# Patient Record
(demographics unavailable — no encounter records)

---

## 2024-11-13 NOTE — DISCUSSION/SUMMARY
[Normal Growth] : growth [Normal Development] : development [None] : No known medical problems [No Elimination Concerns] : elimination [No Feeding Concerns] : feeding [No Skin Concerns] : skin [Normal Sleep Pattern] : sleep [Family Support] : family support [Child Development and Behavior] : child development and behavior [Language Promotion/Hearing] : language promotion/hearing [Toliet Training Readiness] : toliet training readiness [Safety] : safety [No Medications] : ~He/She~ is not on any medications [Parent/Guardian] : parent/guardian [] : The components of the vaccine(s) to be administered today are listed in the plan of care. The disease(s) for which the vaccine(s) are intended to prevent and the risks have been discussed with the caretaker.  The risks are also included in the appropriate vaccination information statements which have been provided to the patient's caregiver.  The caregiver has given consent to vaccinate. [FreeTextEntry1] : Healthy 18 month old Growth and development: normal M-CHAT: normal Discussed safety/anticipatory guidance Discussed need for vaccines, reviewed side effects and VIS Next PE: age 2 years

## 2024-11-13 NOTE — HISTORY OF PRESENT ILLNESS
[Mother] : mother [Normal] : Normal [Playtime] : Playtime  [No] : No cigarette smoke exposure [Water heater temperature set at <120 degrees F] : Water heater temperature set at <120 degrees F [Car seat in back seat] : Car seat in back seat [Carbon Monoxide Detectors] : Carbon monoxide detectors [Smoke Detectors] : Smoke detectors [Up to date] : Up to date [NO] : No

## 2024-11-13 NOTE — PHYSICAL EXAM
[Alert] : alert [No Acute Distress] : no acute distress [Normocephalic] : normocephalic [Anterior [place] Closed] : anterior fontanelle closed [Red Reflex Bilateral] : red reflex bilateral [PERRL] : PERRL [Normally Placed Ears] : normally placed ears [Auricles Well Formed] : auricles well formed [Clear Tympanic membranes with present light reflex and bony landmarks] : clear tympanic membranes with present light reflex and bony landmarks [No Discharge] : no discharge [Nares Patent] : nares patent [Palate Intact] : palate intact [Uvula Midline] : uvula midline [Tooth Eruption] : tooth eruption  [Supple, full passive range of motion] : supple, full passive range of motion [No Palpable Masses] : no palpable masses [Symmetric Chest Rise] : symmetric chest rise [Clear to Auscultation Bilaterally] : clear to auscultation bilaterally [Regular Rate and Rhythm] : regular rate and rhythm [S1, S2 present] : S1, S2 present [No Murmurs] : no murmurs [+2 Femoral Pulses] : +2 femoral pulses [Soft] : soft [NonTender] : non tender [Non Distended] : non distended [Normoactive Bowel Sounds] : normoactive bowel sounds [No Hepatomegaly] : no hepatomegaly [No Splenomegaly] : no splenomegaly [Central Urethral Opening] : central urethral opening [Testicles Descended Bilaterally] : testicles descended bilaterally [Patent] : patent [Normally Placed] : normally placed [No Abnormal Lymph Nodes Palpated] : no abnormal lymph nodes palpated [No Clavicular Crepitus] : no clavicular crepitus [Symmetric Buttocks Creases] : symmetric buttocks creases [No Spinal Dimple] : no spinal dimple [NoTuft of Hair] : no tuft of hair [Cranial Nerves Grossly Intact] : cranial nerves grossly intact [No Rash or Lesions] : no rash or lesions [FreeTextEntry5] : left eye purulent discharge

## 2024-12-16 NOTE — DISCUSSION/SUMMARY
[FreeTextEntry1] : 21 month old w/ likely uri. well appearing, of note w/ strep exposure at home, rapid done in office negative -f/u rvp and throat culture - Recommended supportive care, including antipyretics, fluids, nasal suction, use of humidifiers, and elevating head w/ extra pillow for postnasal drip.  - If new or worsening symptoms or parental concern - return to office or ED.

## 2025-01-06 NOTE — HISTORY OF PRESENT ILLNESS
[de-identified] : COUGH X 1 MONTH [FreeTextEntry6] : Nasal congestion and Cough x3 weeks. No improvement.  No fever, vomiting or diarrhea. Tolerating po intake. Normal UOP.

## 2025-03-13 NOTE — DEVELOPMENTAL MILESTONES
[Normal Development] : Normal Development [Passed] : passed [Yes] : Completed. [FreeTextEntry1] : well  develop

## 2025-03-13 NOTE — PHYSICAL EXAM
[Alert] : alert [No Acute Distress] : no acute distress [Normocephalic] : normocephalic [Anterior [place] Closed] : anterior fontanelle closed [Red Reflex Bilateral] : red reflex bilateral [PERRL] : PERRL [Normally Placed Ears] : normally placed ears [Auricles Well Formed] : auricles well formed [Clear Tympanic membranes with present light reflex and bony landmarks] : clear tympanic membranes with present light reflex and bony landmarks [No Discharge] : no discharge [Nares Patent] : nares patent [Palate Intact] : palate intact [Uvula Midline] : uvula midline [Tooth Eruption] : tooth eruption  [Supple, full passive range of motion] : supple, full passive range of motion [No Palpable Masses] : no palpable masses [Symmetric Chest Rise] : symmetric chest rise [Clear to Auscultation Bilaterally] : clear to auscultation bilaterally [Regular Rate and Rhythm] : regular rate and rhythm [S1, S2 present] : S1, S2 present [No Murmurs] : no murmurs [+2 Femoral Pulses] : +2 femoral pulses [Soft] : soft [NonTender] : non tender [Non Distended] : non distended [Normoactive Bowel Sounds] : normoactive bowel sounds [No Hepatomegaly] : no hepatomegaly [No Splenomegaly] : no splenomegaly [Central Urethral Opening] : central urethral opening [Testicles Descended Bilaterally] : testicles descended bilaterally [Patent] : patent [Normally Placed] : normally placed [No Abnormal Lymph Nodes Palpated] : no abnormal lymph nodes palpated [No Clavicular Crepitus] : no clavicular crepitus [Symmetric Buttocks Creases] : symmetric buttocks creases [No Spinal Dimple] : no spinal dimple [NoTuft of Hair] : no tuft of hair [Cranial Nerves Grossly Intact] : cranial nerves grossly intact [No Rash or Lesions] : no rash or lesions

## 2025-05-16 NOTE — HISTORY OF PRESENT ILLNESS
[de-identified] : COUGH, FEVER, EAR PAIN FOR A FEW DAYS  [FreeTextEntry6] : cough, congestion, low grade fevers x3 days  picking at L ear good PO intake, UOP  and BMs

## 2025-05-16 NOTE — DISCUSSION/SUMMARY
[FreeTextEntry1] : Recommend acetaminophen or ibuprofen prn. Offer teething rings. Apply cold or warm compress to gums.  Recommend symptomatic therapy as needed including acetaminophen or ibuprofen for fever/pain. Increase fluid intake. Avoid airway irritants. Discussed use/ avoidance of cold symptom medication. Cool mist humidifier at nighttime. For congestion- vicks vapor rub, saline sprays/ steamed showers with bulb suction. If patient is of age use the Nedipot as tolerated PRN. Advised to call the office if symptoms do not improve in 3-5 days or sooner for change/concerns/wheezes/distress. Call with any new or worsening symptoms or concerns.